# Patient Record
(demographics unavailable — no encounter records)

---

## 2025-01-28 NOTE — ASSESSMENT
[FreeTextEntry1] : 56M PMHx anxiety presenting for colonoscopy screening evaluation.   #CRC screening evaluation  Index colonoscopy No changes in bowel habits, bloody or black BMs, abdominal pain No Family history of CRC, average risk screening  -Will plan for screening colonoscopy at [facility]. Suprep Rx sent. R/B/A/L discussed with patient. All questions answered to patient's satisfaction. -Counseled on low fiber diet 3-5 days prior to planned procedure -Reviewed most recent BW   #Anxiety  PHQ-2 score 1 Reports history of anxiety, controlled, on Prozac, interested in speaking with a therapist  -Discussed [facility] Navigation program, referral placed today

## 2025-01-28 NOTE — PHYSICAL EXAM
[Alert] : alert [Normal Voice/Communication] : normal voice/communication [Healthy Appearing] : healthy appearing [No Acute Distress] : no acute distress [Sclera] : the sclera and conjunctiva were normal [Hearing Threshold Finger Rub Not [place]] : hearing was normal [Normal Lips/Gums] : the lips and gums were normal [Oropharynx] : the oropharynx was normal [Normal Appearance] : the appearance of the neck was normal [No Neck Mass] : no neck mass was observed [No Respiratory Distress] : no respiratory distress [No Acc Muscle Use] : no accessory muscle use [Respiration, Rhythm And Depth] : normal respiratory rhythm and effort [Abdomen Tenderness] : non-tender [No Masses] : no abdominal mass palpated [Abdomen Soft] : soft [Cervical Lymph Nodes Enlarged Posterior Bilaterally] : no posterior cervical lymphadenopathy [Supraclavicular Lymph Nodes Enlarged Bilaterally] : no supraclavicular lymphadenopathy [Axillary Lymph Nodes Enlarged Bilaterally] : no axillary lymphadenopathy [No CVA Tenderness] : no CVA  tenderness [No Spinal Tenderness] : no spinal tenderness [Abnormal Walk] : normal gait [No Clubbing, Cyanosis] : no clubbing or cyanosis of the fingernails [No Joint Swelling] : no joint swelling seen [Normal Color / Pigmentation] : normal skin color and pigmentation [] : no rash [Cranial Nerves Intact] : cranial nerves 2-12 were intact [Sensation] : the sensory exam was normal to light touch and pinprick [Motor Exam] : the motor exam was normal [Oriented To Time, Place, And Person] : oriented to person, place, and time

## 2025-01-28 NOTE — HISTORY OF PRESENT ILLNESS
[FreeTextEntry1] : HPI- 56M PMHx anxiety presenting for colonoscopy screening evaluation.   Here for index colonoscopy.  Patient reports daily, regular BMs. Non-bloody stools. No abdominal pain, unintentional wt loss. Reports his weight for the most part fluctuates by 5 lbs.  States he has underlying anxiety. Sees a psychiatrist, not a therapist.  ROS otherwise negative for nausea/vomiting, reflux.  Remainder of ROS negative.  PMHx - anxiety PSHx - meniscus tear surgery Rx - Prozac Supplements/herbs/OTC - centrum A/C or NSAIDs? - none FMHx - prostate cancer - father, uncle; no CRC or GI related malignancy; no IBD; father - liver cancer (no underlying cirrhosis, worked in a [employer]) Allergies - pollen; NkDA EtOH - 1x/week Smoking - 1 pack/week Drugs - denies  EGD - no prior Colonoscopy - no prior

## 2025-05-30 NOTE — ASSESSMENT
[FreeTextEntry1] : 56-year-old man, with a past medical history of gastroesophageal reflux disease, presenting for follow up.   Palpitations: RESOLVED. Patient has been experiencing palpitations for >5 years. these feel like momentary, forceful beats occurring at rest. He otherwise denies chest pain, shortness of breath/dyspnea on exertion, dizziness/loss of consciousness, paroxysmal nocturnal dyspnea, orthopnea, headaches, abdominal pain, and lower extremity swelling. He notes several current stressors in his life. Physical exam unremarkable at this time. ECG displays NSR with Q waves in V1-2 (possibly secondary to lead placement).  - transthoracic echocardiogram appreciated, demonstrating a normal biventricular systolic function and no significant valvular disease - MCT x2 weeks deferred - patient instructed to contact me and/or seek immediate medical attention if symptoms recur or worsen  Cigarette Smoker: Patient actively smoking 1 pack per week. - smoking cessation counseling performed - MRT (patches) ordered at this time - will readdress at f/u  F/u in 6 months; or sooner PRN.

## 2025-05-30 NOTE — CARDIOLOGY SUMMARY
[de-identified] : 5/30/25: NSR at 75 bpm, Q waves in V1-2 10/2/24: NSR at 70 bpm, Q waves in V1-2 [de-identified] : TTE 10/14/24: 1. Left ventricular cavity is normal in size. Left ventricular wall thickness is normal. Left ventricular systolic function is normal with an ejection fraction visually estimated at 60 to 65 %. There are no regional wall motion abnormalities seen. 2. There is mild (grade 1) left ventricular diastolic dysfunction, with normal left ventricular filling pressure. 3. Normal right ventricular cavity size, with normal wall thickness, and normal right ventricular systolic function. 4. No significant valvular disease. 5. No pericardial effusion seen.

## 2025-05-30 NOTE — HISTORY OF PRESENT ILLNESS
[FreeTextEntry1] : 56-year-old man, with a past medical history of gastroesophageal reflux disease, presenting for follow up. Patient presently denies chest pain, shortness of breath/dyspnea on exertion, palpitations, dizziness/loss of consciousness, paroxysmal nocturnal dyspnea, orthopnea, headaches, abdominal pain, and lower extremity swelling. He has been exercising by running, though had a recent fibular fracture. Patient notes several current stressors in his life, which have caused depressive episodes.